# Patient Record
Sex: FEMALE | Race: WHITE
[De-identification: names, ages, dates, MRNs, and addresses within clinical notes are randomized per-mention and may not be internally consistent; named-entity substitution may affect disease eponyms.]

---

## 2017-06-16 ENCOUNTER — HOSPITAL ENCOUNTER (OUTPATIENT)
Dept: HOSPITAL 75 - RAD | Age: 52
End: 2017-06-16
Attending: NURSE PRACTITIONER
Payer: COMMERCIAL

## 2017-06-16 DIAGNOSIS — M41.26: ICD-10-CM

## 2017-06-16 DIAGNOSIS — Z12.31: Primary | ICD-10-CM

## 2017-06-16 DIAGNOSIS — M41.24: Primary | ICD-10-CM

## 2017-06-16 PROCEDURE — 72081 X-RAY EXAM ENTIRE SPI 1 VW: CPT

## 2017-06-16 PROCEDURE — 77067 SCR MAMMO BI INCL CAD: CPT

## 2017-06-16 NOTE — DIAGNOSTIC IMAGING REPORT
INDICATION: Scoliosis. Back pain.



EXAM: 3 AP views of the thoracic and lumbar spine were obtained. 



COMPARISON: There are no prior studies available for comparison.



FINDINGS:  

This exam is less than optimal due to the patient's body habitus.



There is mild dextroscoliosis of the lower thoracic spine. Using

the superior endplate of T9 and the inferior endplate of L1 as

landmarks, the measured angle of scoliosis is 11 degrees plus or

minus  2-3 degrees. There is also levoscoliosis of the lumbar

spine. Using the superior endplate of L1 and the inferior

endplate of L4 as landmarks, the measured angle of scoliosis is

10 degrees plus or minus 2-3 degrees.



There is no fracture or acute bony abnormality identified.



IMPRESSION:

1. There is dextroscoliosis of the lower thoracic spine and

levoscoliosis of the lumbar spine. The measured angles of

scoliosis respectively are 11 and 10 degrees plus or minus 2-3

degrees.

2. There is no acute bony abnormality identified.



Dictated by: 



  Dictated on workstation # [id number]

## 2017-06-16 NOTE — DIAGNOSTIC IMAGING REPORT
INDICATION: Digital mammogram bilateral screening.



This study was compared to the prior exams of 12/10/12 and

11/1/11. At this time, there are no current complaints.



The current study was also evaluated with a Computer Aided

Detection (CAD) system.



FINDINGS: The fibroglandular tissue in both breasts is

heterogeneously dense. This does limit the sensitivity of this

exam. Overall, there does not appear to have been any significant

change when compared to the prior study. No primary or secondary

sign of malignancy is noted.



IMPRESSION:

1. There is no evidence of malignancy.

2. The patient should have her annual bilateral screening

mammogram on schedule in June of 2018.



ACR BI-RADS Category 1: Negative.

Result letter will be mailed to the patient.

Note:  At least 10% of breast cancer is not imaged by

mammography.



Dictated by: 



  Dictated on workstation # [id number]

## 2023-05-10 ENCOUNTER — HOSPITAL ENCOUNTER (OUTPATIENT)
Dept: HOSPITAL 75 - RAD | Age: 58
End: 2023-05-10
Attending: PHYSICIAN ASSISTANT
Payer: MEDICAID

## 2023-05-10 DIAGNOSIS — M41.26: Primary | ICD-10-CM

## 2023-05-10 PROCEDURE — 72081 X-RAY EXAM ENTIRE SPI 1 VW: CPT
